# Patient Record
Sex: FEMALE | Race: WHITE | Employment: STUDENT | URBAN - METROPOLITAN AREA
[De-identification: names, ages, dates, MRNs, and addresses within clinical notes are randomized per-mention and may not be internally consistent; named-entity substitution may affect disease eponyms.]

---

## 2017-06-07 ENCOUNTER — HOSPITAL ENCOUNTER (EMERGENCY)
Facility: CLINIC | Age: 22
Discharge: HOME | End: 2017-06-07
Attending: PHYSICIAN ASSISTANT | Admitting: PHYSICIAN ASSISTANT
Payer: COMMERCIAL

## 2017-06-07 ENCOUNTER — APPOINTMENT (OUTPATIENT)
Dept: GENERAL RADIOLOGY | Facility: CLINIC | Age: 22
End: 2017-06-07
Attending: PHYSICIAN ASSISTANT
Payer: COMMERCIAL

## 2017-06-07 VITALS
HEIGHT: 65 IN | HEART RATE: 88 BPM | BODY MASS INDEX: 20.33 KG/M2 | WEIGHT: 122 LBS | RESPIRATION RATE: 20 BRPM | SYSTOLIC BLOOD PRESSURE: 128 MMHG | TEMPERATURE: 98.9 F | DIASTOLIC BLOOD PRESSURE: 70 MMHG | OXYGEN SATURATION: 98 %

## 2017-06-07 DIAGNOSIS — R07.89 ATYPICAL CHEST PAIN: ICD-10-CM

## 2017-06-07 LAB
ALBUMIN SERPL-MCNC: 3.8 G/DL (ref 3.4–5)
ALBUMIN SERPL-MCNC: NORMAL G/DL (ref 3.4–5)
ALP SERPL-CCNC: 76 U/L (ref 40–150)
ALP SERPL-CCNC: NORMAL U/L (ref 40–150)
ALT SERPL W P-5'-P-CCNC: 19 U/L (ref 0–50)
ALT SERPL W P-5'-P-CCNC: NORMAL U/L (ref 0–50)
ANION GAP SERPL CALCULATED.3IONS-SCNC: 8 MMOL/L (ref 3–14)
ANION GAP SERPL CALCULATED.3IONS-SCNC: NORMAL MMOL/L (ref 6–17)
AST SERPL W P-5'-P-CCNC: 9 U/L (ref 0–45)
AST SERPL W P-5'-P-CCNC: NORMAL U/L (ref 0–45)
BASOPHILS # BLD AUTO: 0 10E9/L (ref 0–0.2)
BASOPHILS NFR BLD AUTO: 0.1 %
BILIRUB SERPL-MCNC: 0.2 MG/DL (ref 0.2–1.3)
BILIRUB SERPL-MCNC: NORMAL MG/DL (ref 0.2–1.3)
BUN SERPL-MCNC: 15 MG/DL (ref 7–30)
BUN SERPL-MCNC: NORMAL MG/DL (ref 7–30)
CALCIUM SERPL-MCNC: 8.6 MG/DL (ref 8.5–10.1)
CALCIUM SERPL-MCNC: NORMAL MG/DL (ref 8.5–10.1)
CHLORIDE SERPL-SCNC: 109 MMOL/L (ref 94–109)
CHLORIDE SERPL-SCNC: NORMAL MMOL/L (ref 94–109)
CO2 SERPL-SCNC: 23 MMOL/L (ref 20–32)
CO2 SERPL-SCNC: NORMAL MMOL/L (ref 20–32)
CREAT SERPL-MCNC: 0.7 MG/DL (ref 0.52–1.04)
CREAT SERPL-MCNC: NORMAL MG/DL (ref 0.52–1.04)
D DIMER PPP FEU-MCNC: NORMAL UG/ML FEU (ref 0–0.5)
D DIMER PPP FEU-MCNC: NORMAL UG/ML FEU (ref 0–0.5)
DIFFERENTIAL METHOD BLD: NORMAL
DIFFERENTIAL METHOD BLD: NORMAL
EOSINOPHIL # BLD AUTO: 0 10E9/L (ref 0–0.7)
EOSINOPHIL NFR BLD AUTO: 0.1 %
ERYTHROCYTE [DISTWIDTH] IN BLOOD BY AUTOMATED COUNT: 12.8 % (ref 10–15)
ERYTHROCYTE [DISTWIDTH] IN BLOOD BY AUTOMATED COUNT: NORMAL % (ref 10–15)
GFR SERPL CREATININE-BSD FRML MDRD: ABNORMAL ML/MIN/1.7M2
GFR SERPL CREATININE-BSD FRML MDRD: NORMAL ML/MIN/1.7M2
GLUCOSE SERPL-MCNC: 101 MG/DL (ref 70–99)
GLUCOSE SERPL-MCNC: NORMAL MG/DL (ref 70–99)
HCG SERPL QL: NEGATIVE
HCT VFR BLD AUTO: 39.5 % (ref 35–47)
HCT VFR BLD AUTO: NORMAL % (ref 35–47)
HGB BLD-MCNC: 13.9 G/DL (ref 11.7–15.7)
HGB BLD-MCNC: NORMAL G/DL (ref 11.7–15.7)
IMM GRANULOCYTES # BLD: 0 10E9/L (ref 0–0.4)
IMM GRANULOCYTES NFR BLD: 0.1 %
INTERPRETATION ECG - MUSE: NORMAL
LYMPHOCYTES # BLD AUTO: 2.4 10E9/L (ref 0.8–5.3)
LYMPHOCYTES NFR BLD AUTO: 30.2 %
MCH RBC QN AUTO: 30.3 PG (ref 26.5–33)
MCH RBC QN AUTO: NORMAL PG (ref 26.5–33)
MCHC RBC AUTO-ENTMCNC: 35.2 G/DL (ref 31.5–36.5)
MCHC RBC AUTO-ENTMCNC: NORMAL G/DL (ref 31.5–36.5)
MCV RBC AUTO: 86 FL (ref 78–100)
MCV RBC AUTO: NORMAL FL (ref 78–100)
MONOCYTES # BLD AUTO: 0.6 10E9/L (ref 0–1.3)
MONOCYTES NFR BLD AUTO: 6.9 %
NEUTROPHILS # BLD AUTO: 5 10E9/L (ref 1.6–8.3)
NEUTROPHILS NFR BLD AUTO: 62.6 %
NRBC # BLD AUTO: 0 10*3/UL
NRBC BLD AUTO-RTO: 0 /100
PLATELET # BLD AUTO: 268 10E9/L (ref 150–450)
PLATELET # BLD AUTO: NORMAL 10E9/L (ref 150–450)
POTASSIUM SERPL-SCNC: 4 MMOL/L (ref 3.4–5.3)
POTASSIUM SERPL-SCNC: NORMAL MMOL/L (ref 3.4–5.3)
PROT SERPL-MCNC: 7.3 G/DL (ref 6.8–8.8)
PROT SERPL-MCNC: NORMAL G/DL (ref 6.8–8.8)
RBC # BLD AUTO: 4.59 10E12/L (ref 3.8–5.2)
RBC # BLD AUTO: NORMAL 10E12/L (ref 3.8–5.2)
SODIUM SERPL-SCNC: 140 MMOL/L (ref 133–144)
SODIUM SERPL-SCNC: NORMAL MMOL/L (ref 133–144)
TROPONIN I SERPL-MCNC: NORMAL UG/L (ref 0–0.04)
TROPONIN I SERPL-MCNC: NORMAL UG/L (ref 0–0.04)
WBC # BLD AUTO: 7.9 10E9/L (ref 4–11)
WBC # BLD AUTO: NORMAL 10E9/L (ref 4–11)

## 2017-06-07 PROCEDURE — 84484 ASSAY OF TROPONIN QUANT: CPT | Performed by: PHYSICIAN ASSISTANT

## 2017-06-07 PROCEDURE — 84703 CHORIONIC GONADOTROPIN ASSAY: CPT | Performed by: PHYSICIAN ASSISTANT

## 2017-06-07 PROCEDURE — 99285 EMERGENCY DEPT VISIT HI MDM: CPT | Mod: 25

## 2017-06-07 PROCEDURE — 85379 FIBRIN DEGRADATION QUANT: CPT | Performed by: PHYSICIAN ASSISTANT

## 2017-06-07 PROCEDURE — 96374 THER/PROPH/DIAG INJ IV PUSH: CPT

## 2017-06-07 PROCEDURE — 85025 COMPLETE CBC W/AUTO DIFF WBC: CPT | Performed by: PHYSICIAN ASSISTANT

## 2017-06-07 PROCEDURE — 25000128 H RX IP 250 OP 636: Performed by: PHYSICIAN ASSISTANT

## 2017-06-07 PROCEDURE — 80053 COMPREHEN METABOLIC PANEL: CPT | Performed by: PHYSICIAN ASSISTANT

## 2017-06-07 PROCEDURE — 71020 XR CHEST 2 VW: CPT

## 2017-06-07 PROCEDURE — 93005 ELECTROCARDIOGRAM TRACING: CPT

## 2017-06-07 RX ORDER — SODIUM CHLORIDE 9 MG/ML
1000 INJECTION, SOLUTION INTRAVENOUS CONTINUOUS
Status: DISCONTINUED | OUTPATIENT
Start: 2017-06-07 | End: 2017-06-07 | Stop reason: HOSPADM

## 2017-06-07 RX ORDER — KETOROLAC TROMETHAMINE 30 MG/ML
30 INJECTION, SOLUTION INTRAMUSCULAR; INTRAVENOUS ONCE
Status: DISCONTINUED | OUTPATIENT
Start: 2017-06-07 | End: 2017-06-07

## 2017-06-07 RX ORDER — IBUPROFEN 600 MG/1
600 TABLET, FILM COATED ORAL ONCE
Status: DISCONTINUED | OUTPATIENT
Start: 2017-06-07 | End: 2017-06-07

## 2017-06-07 RX ORDER — KETOROLAC TROMETHAMINE 30 MG/ML
30 INJECTION, SOLUTION INTRAMUSCULAR; INTRAVENOUS ONCE
Status: COMPLETED | OUTPATIENT
Start: 2017-06-07 | End: 2017-06-07

## 2017-06-07 RX ADMIN — KETOROLAC TROMETHAMINE 30 MG: 30 INJECTION, SOLUTION INTRAMUSCULAR at 17:40

## 2017-06-07 ASSESSMENT — ENCOUNTER SYMPTOMS
VOMITING: 0
SHORTNESS OF BREATH: 1
LIGHT-HEADEDNESS: 1
CHEST TIGHTNESS: 1
NAUSEA: 0
FEVER: 0
ABDOMINAL PAIN: 0
COUGH: 0

## 2017-06-07 NOTE — ED AVS SNAPSHOT
Emergency Department    [address] [facility] [address]    Phone:  [phone]    Fax:  [phone]                                       Denita Feldman   MRN: [id number]    Department:   Emergency Department   Date of Visit:  6/7/2017           Patient Information     Date Of Birth          1995        Your diagnoses for this visit were:     Atypical chest pain        You were seen by Eli Goodrich PA-C.      Follow-up Information     Follow up with Kamila Jarquin MD In 2 days.    Specialty:  Family Practice    Contact information:    [facility]  [address]  [facility] [address] [phone]          Follow up with  Emergency Department.    Specialty:  EMERGENCY MEDICINE    Why:  If symptoms worsen    Contact information:    [address] [facility] [address] [phone]        Discharge Instructions         You had 30mg of Toradol at 5:40pm, this is a form of ibuprofen, you should wait 6hr before taking any further ibuprofen.        Discharge Instructions  Chest Pain    You have been seen today for chest pain or discomfort.  At this time, your doctor has found no signs that your chest pain is due to a serious or life-threatening condition, (or you have declined more testing and/or admission to the hospital). However, sometimes there is a serious problem that does not show up right away. Your evaluation today may not be complete and you may need further testing and evaluation.     You need to follow-up with your regular doctor within 3 days.    Return to the Emergency Department if:    Your chest pain changes, gets worse, starts to happen more often, or comes with less activity.    You are short of breath.    You get very weak or tired.    You pass out or faint.    You have any new symptoms, like fever, cough, numb legs, or you cough up blood.    You have anything else that worries you.    Until you follow-up with your regular doctor  please do the following:    Take one aspirin daily unless you have an allergy or are told not to by your doctor.    If a stress test appointment has been made, go to the appointment.    If you have questions, contact your regular doctor.    If your doctor today has told you to follow-up with your regular doctor, it is very important that you make an appointment with your clinic and go to the appointment.  If you do not follow-up with your primary doctor, it may result in missing an important development which could result in permanent injury or disability and/or lasting pain.  If there is any problem keeping your appointment, call your doctor or return to the Emergency Department.    If you were given a prescription for medicine here today, be sure to read all of the information (including the package insert) that comes with your prescription.  This will include important information about the medicine, its side effects, and any warnings that you need to know about.  The pharmacist who fills the prescription can provide more information and answer questions you may have about the medicine.  If you have questions or concerns that the pharmacist cannot address, please call or return to the Emergency Department.         Discharge References/Attachments     [facility] (ENGLISH)      24 Hour Appointment Hotline       To make an appointment at any [facility], call [phone] ([phone]). If you don't have a family doctor or clinic, we will help you find one. [facility] are conveniently located to serve the needs of you and your family.             Review of your medicines      Our records show that you are taking the medicines listed below. If these are incorrect, please call your family doctor or clinic.        Dose / Directions Last dose taken    ALLEGRA PO   Dose:  180 mg        Take 180 mg by mouth daily.   Refills:  0        fluticasone 50 MCG/ACT spray   Commonly known as:  FLONASE   Dose:   2 spray        Spray 2 sprays into both nostrils daily.   Refills:  0        MONTELUKAST SODIUM PO   Dose:  10 mg        Take 10 mg by mouth daily (with breakfast).   Refills:  0        norethindrone-ethinyl estradiol-iron 1.5-30 MG-MCG per tablet   Commonly known as:  MICROGESTIN FE1.5/30   Dose:  1 tablet        Take 1 tablet by mouth daily. Patient advised to bring in from home.   Refills:  0        olopatadine 0.6 % nasal spray   Commonly known as:  PATANASE   Dose:  2 spray        Spray 2 sprays into both nostrils daily.   Refills:  0        PREDNISONE PO   Dose:  20 mg        Take 20 mg by mouth daily   Refills:  0                Procedures and tests performed during your visit     Procedure/Test Number of Times Performed    CBC with platelets differential 2    Comprehensive metabolic panel 2    D dimer quantitative 2    EKG 12 lead 1    HCG QUALitative pregnancy (blood) 1    Troponin I 2    XR Chest 2 Views 1      Orders Needing Specimen Collection     None      Pending Results     No orders found from 6/5/2017 to 6/8/2017.            Pending Culture Results     No orders found from 6/5/2017 to 6/8/2017.            Pending Results Instructions     If you had any lab results that were not finalized at the time of your Discharge, you can call the ED Lab Result RN at [phone]. You will be contacted by this team for any positive Lab results or changes in treatment. The nurses are available 7 days a week from 10A to 6:30P.  You can leave a message 24 hours per day and they will return your call.        Test Results From Your Hospital Stay        6/7/2017  5:01 PM      Component Results     Component Value Ref Range & Units Status    WBC  4.0 - 11.0 10e9/L Final    Canceled, Test credited   Unsatisfactory specimen - clotted  NOTED ON TRACKBOARD      RBC Count  3.8 - 5.2 10e12/L Final    Canceled, Test credited   Unsatisfactory specimen - clotted  NOTED ON TRACKBOARD      Hemoglobin  11.7 - 15.7 g/dL Final     Canceled, Test credited   Unsatisfactory specimen - clotted  NOTED ON TRACKBOARD      Hematocrit  35.0 - 47.0 % Final    Canceled, Test credited   Unsatisfactory specimen - clotted  NOTED ON TRACKBOARD      MCV  78 - 100 fl Final    Canceled, Test credited   Unsatisfactory specimen - clotted  NOTED ON TRACKBOARD      MCH  26.5 - 33.0 pg Final    Canceled, Test credited   Unsatisfactory specimen - clotted  NOTED ON TRACKBOARD      MCHC  31.5 - 36.5 g/dL Final    Canceled, Test credited   Unsatisfactory specimen - clotted  NOTED ON TRACKBOARD      RDW  10.0 - 15.0 % Final    Canceled, Test credited   Unsatisfactory specimen - clotted  NOTED ON TRACKBOARD      Platelet Count  150 - 450 10e9/L Final    Canceled, Test credited   Unsatisfactory specimen - clotted  NOTED ON TRACKBOARD      Diff Method   Final    Canceled, Test credited   Unsatisfactory specimen - clotted  NOTED ON TRACKBOARD           6/7/2017  4:57 PM      Component Results     Component Value Ref Range & Units Status    Troponin I ES  0.000 - 0.045 ug/L Final    Canceled, Test credited   Unsatisfactory specimen - hemolyzed  NOTED ON TRACKBOARD @1645 BT           6/7/2017  4:57 PM      Component Results     Component Value Ref Range & Units Status    Sodium  133 - 144 mmol/L Final    Canceled, Test credited   Unsatisfactory specimen - hemolyzed  NOTED ON TRACKBOARD @1645 BT      Potassium  3.4 - 5.3 mmol/L Final    Canceled, Test credited   Unsatisfactory specimen - hemolyzed  NOTED ON TRACKBOARD @1645 BT      Chloride  94 - 109 mmol/L Final    Canceled, Test credited   Unsatisfactory specimen - hemolyzed  NOTED ON TRACKBOARD @1645 BT      Carbon Dioxide  20 - 32 mmol/L Final    Canceled, Test credited   Unsatisfactory specimen - hemolyzed  NOTED ON TRACKBOARD @1645 BT      Anion Gap  6 - 17 mmol/L Final    Canceled, Test credited   Unsatisfactory specimen - hemolyzed  NOTED ON TRACKBOARD @1645 BT      Glucose  70 - 99 mg/dL Final    Canceled, Test  credited   Unsatisfactory specimen - hemolyzed  NOTED ON TRACKBOARD @1645 BT      Urea Nitrogen  7 - 30 mg/dL Final    Canceled, Test credited   Unsatisfactory specimen - hemolyzed  NOTED ON TRACKBOARD @1645 BT      Creatinine  0.52 - 1.04 mg/dL Final    Canceled, Test credited   Unsatisfactory specimen - hemolyzed  NOTED ON TRACKBOARD @1645 BT      GFR Estimate  >60 mL/min/1.7m2 Final    Canceled, Test credited   Unsatisfactory specimen - hemolyzed  NOTED ON TRACKBOARD @1645 BT      GFR Estimate If Black  >60 mL/min/1.7m2 Final    Canceled, Test credited   Unsatisfactory specimen - hemolyzed  NOTED ON TRACKBOARD @1645 BT      Calcium  8.5 - 10.1 mg/dL Final    Canceled, Test credited   Unsatisfactory specimen - hemolyzed  NOTED ON TRACKBOARD @1645 BT      Bilirubin Total  0.2 - 1.3 mg/dL Final    Canceled, Test credited   Unsatisfactory specimen - hemolyzed  NOTED ON TRACKBOARD @1645 BT      Albumin  3.4 - 5.0 g/dL Final    Canceled, Test credited   Unsatisfactory specimen - hemolyzed  NOTED ON TRACKBOARD @1645 BT      Protein Total  6.8 - 8.8 g/dL Final    Canceled, Test credited   Unsatisfactory specimen - hemolyzed  NOTED ON TRACKBOARD @1645 BT      Alkaline Phosphatase  40 - 150 U/L Final    Canceled, Test credited   Unsatisfactory specimen - hemolyzed  NOTED ON TRACKBOARD @1645 BT      ALT  0 - 50 U/L Final    Canceled, Test credited   Unsatisfactory specimen - hemolyzed  NOTED ON TRACKBOARD @1645 BT      AST  0 - 45 U/L Final    Canceled, Test credited   Unsatisfactory specimen - hemolyzed  NOTED ON TRACKBOARD @1645 BT           6/7/2017  5:02 PM      Component Results     Component Value Ref Range & Units Status    D Dimer  0.0 - 0.50 ug/ml FEU Final    Unsatisfactory specimen - hemolyzed  NOTED ON TRACKBOARD           6/7/2017  5:52 PM      Narrative     XR CHEST 2 VW   6/7/2017 5:39 PM     HISTORY: Chest pain    COMPARISON: None.        Impression     IMPRESSION: Normal.    JADON LUCIANO MD         6/7/2017   4:56 PM      Component Results     Component Value Ref Range & Units Status    HCG Qualitative Serum Negative NEG Final         6/7/2017  5:59 PM      Component Results     Component Value Ref Range & Units Status    Sodium 140 133 - 144 mmol/L Final    Potassium 4.0 3.4 - 5.3 mmol/L Final    Chloride 109 94 - 109 mmol/L Final    Carbon Dioxide 23 20 - 32 mmol/L Final    Anion Gap 8 3 - 14 mmol/L Final    Glucose 101 (H) 70 - 99 mg/dL Final    Urea Nitrogen 15 7 - 30 mg/dL Final    Creatinine 0.70 0.52 - 1.04 mg/dL Final    GFR Estimate >90  Non  GFR Calc   >60 mL/min/1.7m2 Final    GFR Estimate If Black >90   GFR Calc   >60 mL/min/1.7m2 Final    Calcium 8.6 8.5 - 10.1 mg/dL Final    Bilirubin Total 0.2 0.2 - 1.3 mg/dL Final    Albumin 3.8 3.4 - 5.0 g/dL Final    Protein Total 7.3 6.8 - 8.8 g/dL Final    Alkaline Phosphatase 76 40 - 150 U/L Final    ALT 19 0 - 50 U/L Final    AST 9 0 - 45 U/L Final         6/7/2017  5:40 PM      Component Results     Component Value Ref Range & Units Status    WBC 7.9 4.0 - 11.0 10e9/L Final    RBC Count 4.59 3.8 - 5.2 10e12/L Final    Hemoglobin 13.9 11.7 - 15.7 g/dL Final    Hematocrit 39.5 35.0 - 47.0 % Final    MCV 86 78 - 100 fl Final    MCH 30.3 26.5 - 33.0 pg Final    MCHC 35.2 31.5 - 36.5 g/dL Final    RDW 12.8 10.0 - 15.0 % Final    Platelet Count 268 150 - 450 10e9/L Final    Diff Method Automated Method  Final    % Neutrophils 62.6 % Final    % Lymphocytes 30.2 % Final    % Monocytes 6.9 % Final    % Eosinophils 0.1 % Final    % Basophils 0.1 % Final    % Immature Granulocytes 0.1 % Final    Nucleated RBCs 0 0 /100 Final    Absolute Neutrophil 5.0 1.6 - 8.3 10e9/L Final    Absolute Lymphocytes 2.4 0.8 - 5.3 10e9/L Final    Absolute Monocytes 0.6 0.0 - 1.3 10e9/L Final    Absolute Eosinophils 0.0 0.0 - 0.7 10e9/L Final    Absolute Basophils 0.0 0.0 - 0.2 10e9/L Final    Abs Immature Granulocytes 0.0 0 - 0.4 10e9/L Final    Absolute  Nucleated RBC 0.0  Final         6/7/2017  5:59 PM      Component Results     Component Value Ref Range & Units Status    Troponin I ES  0.000 - 0.045 ug/L Final    <0.015  The 99th percentile for upper reference range is 0.045 ug/L.  Troponin values in   the range of 0.045 - 0.120 ug/L may be associated with risks of adverse   clinical events.           6/7/2017  5:52 PM      Component Results     Component Value Ref Range & Units Status    D Dimer  0.0 - 0.50 ug/ml FEU Final    <0.3  This D-dimer assay is intended for use in conjuntion with a clinical pretest   probability assessment model to exclude pulmonary embolism (PE) and as an aid   in the diagnosis of deep venous thrombosis (DVT) in outpatients suspected of PE   or DVT. The cut-off value is 0.5 g/mL FEU.                  Clinical Quality Measure: Blood Pressure Screening     Your blood pressure was checked while you were in the emergency department today. The last reading we obtained was  BP: 133/71 . Please read the guidelines below about what these numbers mean and what you should do about them.  If your systolic blood pressure (the top number) is less than 120 and your diastolic blood pressure (the bottom number) is less than 80, then your blood pressure is normal. There is nothing more that you need to do about it.  If your systolic blood pressure (the top number) is 120-139 or your diastolic blood pressure (the bottom number) is 80-89, your blood pressure may be higher than it should be. You should have your blood pressure rechecked within a year by a primary care provider.  If your systolic blood pressure (the top number) is 140 or greater or your diastolic blood pressure (the bottom number) is 90 or greater, you may have high blood pressure. High blood pressure is treatable, but if left untreated over time it can put you at risk for heart attack, stroke, or kidney failure. You should have your blood pressure rechecked by a primary care provider  "within the next 4 weeks.  If your provider in the emergency department today gave you specific instructions to follow-up with your doctor or provider even sooner than that, you should follow that instruction and not wait for up to 4 weeks for your follow-up visit.        Thank you for choosing [facility]       Thank you for choosing [facility] for your care. Our goal is always to provide you with excellent care. Hearing back from our patients is one way we can continue to improve our services. Please take a few minutes to complete the written survey that you may receive in the mail after you visit with us. Thank you!        [facility] Information     [facility] lets you send messages to your doctor, view your test results, renew your prescriptions, schedule appointments and more. To sign up, go to [email or web address] . Click on \"Log in\" on the left side of the screen, which will take you to the Welcome page. Then click on \"Sign up Now\" on the right side of the page.     You will be asked to enter the access code listed below, as well as some personal information. Please follow the directions to create your username and password.     Your access code is: [email or web address]  Expires: 2017  6:45 PM     Your access code will  in 90 days. If you need help or a new code, please call your [facility] clinic or [phone].        Care EveryWhere ID     This is your Care EveryWhere ID. This could be used by other organizations to access your [facility] medical records  [id number]        After Visit Summary       This is your record. Keep this with you and show to your community pharmacist(s) and doctor(s) at your next visit.                  "

## 2017-06-07 NOTE — ED PROVIDER NOTES
History     Chief Complaint:  Chest Tightness    HPI   Denita Feldman is a 22 year old female who presents with chest pain. Yesterday the patient went to receive allergy shots between the hours of 0800 and 1200. Last night at 1700 the patient reports experiencing chest tightness associated with slight chest pain and shortness of breath. During a walk this morning patient became very shortness of breath and felt like she may pass out.  Symptoms persisted prompting visit to the emergency department. She currently endorses discomfort/pain rated at 5/10 in severity. She denies fever, cough, abdominal pain, nausea, or vomiting as well as any leg swelling or calf pain. Of note, mother denies any family history of cardiac issues at a young age or sudden death.     CARDIAC RISK FACTORS:  Sex:    F  Hypertension:   Neg  Hyperlipidemia:  Neg  Diabetes:   Neg  Family History:  Neg    PE/DVT RISK FACTORS:  Sex:    F  Hormones:   Neg  Cancer:   Neg  Other immobilization: Neg    Allergies:  Penicillins      Medications:    Microgestin  Fexofenadine  Fluticasone  Montelukast sodium    Past Medical History:    Allergic state  Ovarian cyst    Past Surgical History:    Lap appendectomy     Family History:    History reviewed. No pertinent family history.    No family history of heart disease at early age. No family history of sudden cardiac death.     Social History:  Presents with mother   PCP: Kamila Jarquin MD    Marital Status:  Single       Review of Systems   Constitutional: Negative for fever.   Respiratory: Positive for chest tightness and shortness of breath. Negative for cough.    Cardiovascular: Positive for chest pain.   Gastrointestinal: Negative for abdominal pain, nausea and vomiting.   Neurological: Positive for light-headedness (resolved).   All other systems reviewed and are negative.      Physical Exam     Patient Vitals for the past 24 hrs:   BP Temp Temp src Pulse Heart Rate Resp SpO2 Height Weight  "  06/07/17 1848 128/70 - - 88 - 20 98 % - -   06/07/17 1505 133/71 98.9  F (37.2  C) Oral - 91 16 97 % 1.651 m (5' 5\") 55.3 kg (122 lb)      Physical Exam  Nursing note and vitals reviewed.     GENERAL: Alert, mild distress, non toxic appearing.   HEENT: Normal conjunctiva. No scleral icterus. MMM. No edema of posterior pharynx, tongue, or face. Tolerating oral secretions without difficulty.   NECK: Supple.  CHEST: Reproducible tenderness over anterior chest wall.   CARDIAC: Normal rate and regular rhythm. Normal heart sounds. No murmurs, rubs, or gallops appreciated.   PULMONARY: CTA bilaterally. Normal breath sounds. No wheezing, crackles, or rhonchi appreciated. No stridor. No accessory muscle usage. Speaking full sentences without difficulty.   ABDOMEN: Soft, non distended abdomen. Non-tender. No rebound or guarding.   NEURO: Alert and oriented. Non-focal.   MUSCULOSKELETAL: Normal range of motion. No peripheral edema. No calf tenderness bilaterally.   SKIN: Skin is warm and dry. No rashes. No pallor or jaundice.   PSYCH: Normal affect and mood.      Emergency Department Course   ECG (15:00:54):  Rate 78 bpm. WI interval 152. QRS duration 80. QT/QTc 374/426. P-R-T axes 56 80 29. Sinus rhythm.  Normal ECG.  No significant change when compared to EKG dated 2/25/14.     Imaging:  Radiographic findings were communicated with the patient and family who voiced understanding of the findings.    XR Chest:  IMPRESSION: Normal.    JADON LUCIANO MD    Results per radiology.    Laboratory:  CBC: WNL (WBC 7.9, HGB 13.9, )   CMP: Glucose 101 (H) ow WNL (Creatinine 0.70)   D-dimer: <0.3   1719: Troponin: <0.015     HCG qual (blood): Negative    Interventions:  1740: Toradol 30 mg IM     Emergency Department Course:  Past medical records, nursing notes, and vitals reviewed.  1617: I performed an exam of the patient and obtained history as documented above.    Above workup undertaken.  She had some improvement following " above interventions.   I personally reviewed the laboratory results with the Patient and mother and answered all related questions prior to discharge.    1831: I rechecked the patient.  Findings and plan explained to the Patient and mother. Patient discharged home with instructions regarding supportive care, medications, and reasons to return. The importance of close follow-up was reviewed.      Impression & Plan    Medical Decision Making:  Denita Feldman is a 22 year old female who presents with concern for chest tightness and mild discomfort since yesterday around 5 pm.  She had received allergy shots yesterday morning and was concerned this was a possible reaction.  Here, she is afebrile, hemodynamically stable, and nontoxic appearing.  She has no signs of respiratory distress.  She does have some reproducible tenderness over the anterior chest wall.  The patient is not pregnant.  No evidence of anemia or acute electrolyte abnormality.  Chest XR without signs of pneumothorax, pleural effusion, pneumonia, or widened mediastinum to suggest dissection.  She is on birth control thus I considered PE, however, d-dimer within normal limits and she has no other PE risk factors, no tachycardia or hypoxia, making PE unlikely. EKG does not show any concerning arrhythmia or acute ischemic changes.  Troponin is unremarkable after greater than 6 hours of symptoms.  Given this, and no family history of sudden cardiac death or personal history of cardiac risk factors, I have low suspicion for ACS.  She had some improvement following the above interventions.  At this time clinical presentation is most consistent with chest wall pain.  I did review with mother than I cannot definitively exclude a side effect from the allergy shots though I discussed that this does not represent an allergic reaction as she has no signs of anaphylaxis on exam. I discussed that if this recurs again following allergy shots she is to  discontinue these. She had some improvement following above interventions. Recommended rest, Ibuprofen, ice to the chest, and follow up with PCP if not improving.  Reviewed reasons to return to ED with patient and mother, including worsening symptoms, leg swelling, calf pain, facial or throat swelling, or any new concerns. The patient was in agreement with plan and discharged in satisfactory condition with all questions answered.      Diagnosis:    ICD-10-CM    1. Atypical chest pain R07.89      Disposition:  Discharged to home with plan as outlined.        Newton Mason  6/7/2017    EMERGENCY DEPARTMENT      [place] am serving as a scribe at 4:17 PM on 6/7/2017 to document services personally performed by Eli Goodrich PA-C based on my observations and the provider's statements to me.             Eli Goodrich PA-C  06/08/17 0243

## 2017-06-07 NOTE — ED NOTES
Difficult IV start, multiple attempts, also with US. Will not do an IV at the moment and wait for lab results

## 2017-06-07 NOTE — DISCHARGE INSTRUCTIONS
You had 30mg of Toradol at 5:40pm, this is a form of ibuprofen, you should wait 6hr before taking any further ibuprofen.        Discharge Instructions  Chest Pain    You have been seen today for chest pain or discomfort.  At this time, your doctor has found no signs that your chest pain is due to a serious or life-threatening condition, (or you have declined more testing and/or admission to the hospital). However, sometimes there is a serious problem that does not show up right away. Your evaluation today may not be complete and you may need further testing and evaluation.     You need to follow-up with your regular doctor within 3 days.    Return to the Emergency Department if:    Your chest pain changes, gets worse, starts to happen more often, or comes with less activity.    You are short of breath.    You get very weak or tired.    You pass out or faint.    You have any new symptoms, like fever, cough, numb legs, or you cough up blood.    You have anything else that worries you.    Until you follow-up with your regular doctor please do the following:    Take one aspirin daily unless you have an allergy or are told not to by your doctor.    If a stress test appointment has been made, go to the appointment.    If you have questions, contact your regular doctor.    If your doctor today has told you to follow-up with your regular doctor, it is very important that you make an appointment with your clinic and go to the appointment.  If you do not follow-up with your primary doctor, it may result in missing an important development which could result in permanent injury or disability and/or lasting pain.  If there is any problem keeping your appointment, call your doctor or return to the Emergency Department.    If you were given a prescription for medicine here today, be sure to read all of the information (including the package insert) that comes with your prescription.  This will include important information  about the medicine, its side effects, and any warnings that you need to know about.  The pharmacist who fills the prescription can provide more information and answer questions you may have about the medicine.  If you have questions or concerns that the pharmacist cannot address, please call or return to the Emergency Department.

## 2017-06-07 NOTE — ED AVS SNAPSHOT
Emergency Department    [address] [place] [id number]    Phone:  [phone]    Fax:  [phone]                                       Denita Feldman   MRN: [id number]    Department:   Emergency Department   Date of Visit:  6/7/2017           After Visit Summary Signature Page     I have received my discharge instructions, and my questions have been answered. I have discussed any challenges I see with this plan with the nurse or doctor.    ..........................................................................................................................................  Patient/Patient Representative Signature      ..........................................................................................................................................  Patient Representative Print Name and Relationship to Patient    ..................................................               ................................................  Date                                            Time    ..........................................................................................................................................  Reviewed by Signature/Title    ...................................................              ..............................................  Date                                                            Time

## 2017-08-05 ENCOUNTER — HEALTH MAINTENANCE LETTER (OUTPATIENT)
Age: 22
End: 2017-08-05